# Patient Record
Sex: FEMALE | Race: WHITE
[De-identification: names, ages, dates, MRNs, and addresses within clinical notes are randomized per-mention and may not be internally consistent; named-entity substitution may affect disease eponyms.]

---

## 2020-10-14 ENCOUNTER — HOSPITAL ENCOUNTER (OUTPATIENT)
Dept: HOSPITAL 47 - ORWHC2ENDO | Age: 70
Discharge: HOME | End: 2020-10-14
Attending: INTERNAL MEDICINE
Payer: MEDICARE

## 2020-10-14 VITALS — DIASTOLIC BLOOD PRESSURE: 74 MMHG | SYSTOLIC BLOOD PRESSURE: 117 MMHG | RESPIRATION RATE: 18 BRPM | HEART RATE: 69 BPM

## 2020-10-14 VITALS — TEMPERATURE: 97.5 F

## 2020-10-14 VITALS — BODY MASS INDEX: 33.8 KG/M2

## 2020-10-14 DIAGNOSIS — F32.9: ICD-10-CM

## 2020-10-14 DIAGNOSIS — Z79.51: ICD-10-CM

## 2020-10-14 DIAGNOSIS — J45.909: ICD-10-CM

## 2020-10-14 DIAGNOSIS — Z90.710: ICD-10-CM

## 2020-10-14 DIAGNOSIS — G43.909: ICD-10-CM

## 2020-10-14 DIAGNOSIS — Z96.651: ICD-10-CM

## 2020-10-14 DIAGNOSIS — Z86.010: ICD-10-CM

## 2020-10-14 DIAGNOSIS — Z79.890: ICD-10-CM

## 2020-10-14 DIAGNOSIS — K62.1: ICD-10-CM

## 2020-10-14 DIAGNOSIS — Z88.1: ICD-10-CM

## 2020-10-14 DIAGNOSIS — E07.9: ICD-10-CM

## 2020-10-14 DIAGNOSIS — E78.5: ICD-10-CM

## 2020-10-14 DIAGNOSIS — Z88.5: ICD-10-CM

## 2020-10-14 DIAGNOSIS — Z86.69: ICD-10-CM

## 2020-10-14 DIAGNOSIS — Z12.11: Primary | ICD-10-CM

## 2020-10-14 DIAGNOSIS — Z79.899: ICD-10-CM

## 2020-10-14 DIAGNOSIS — Z98.890: ICD-10-CM

## 2020-10-14 PROCEDURE — 45380 COLONOSCOPY AND BIOPSY: CPT

## 2020-10-14 PROCEDURE — 88305 TISSUE EXAM BY PATHOLOGIST: CPT

## 2020-10-14 RX ADMIN — POTASSIUM CHLORIDE SCH MLS: 14.9 INJECTION, SOLUTION INTRAVENOUS at 08:44

## 2020-10-14 RX ADMIN — POTASSIUM CHLORIDE SCH MLS: 14.9 INJECTION, SOLUTION INTRAVENOUS at 08:26

## 2020-10-14 NOTE — P.PCN
Date of Procedure: 10/14/20


Procedure(s) Performed: 


BRIEF HISTORY: Patient is a 62-year-old pleasant white female scheduled for an 

elective colonoscopy as a part of screening for colorectal neoplasia.





PROCEDURE PERFORMED: Colonoscopy with biopsy. 





PREOPERATIVE DIAGNOSIS: Screening for colon cancer. 





IV sedation per Anesthesia. 





PROCEDURE: After informed consent was obtained, the patient, was brought into 

the endoscopy unit. IV sedation was administered by Anesthesia under continuous 

monitoring.  Digital rectal examination was normal. Initially the Olympus CF-160

flexible video colonoscope was then inserted in the rectum, gradually advanced 

into the cecum without any difficulty. Careful examination was performed as the 

scope was gradually being withdrawn. Ileocecal valve and the appendiceal orifice

were visualized and appeared normal.  Prep was excellent. Mucosa of the cecum, 

ascending colon, transverse colon, descending colon, sigmoid colon, and rectum 

appeared normal.  In the distal rectum there were 2 small 3 mm polyps removed by

cold biopsy. Retroflexion was performed in the rectum and no lesions were seen. 

The patient tolerated the procedure well. 





IMPRESSION: 


3 mm 2 rectal polyps status post removal by cold biopsy


Rest of the colon appeared





RECOMMENDATIONS:  Findings of this examination were discussed with the patient 

as well as her family.  She was advised to follow with the biopsy results.  If 

the biopsy shows an adenoma she can have a repeat colonoscopy in 5 years.


70

## 2021-06-21 ENCOUNTER — HOSPITAL ENCOUNTER (OUTPATIENT)
Dept: HOSPITAL 47 - RADMAMWWP | Age: 71
Discharge: HOME | End: 2021-06-21
Attending: FAMILY MEDICINE
Payer: MEDICARE

## 2021-06-21 DIAGNOSIS — Z78.0: ICD-10-CM

## 2021-06-21 DIAGNOSIS — R06.2: ICD-10-CM

## 2021-06-21 DIAGNOSIS — M47.816: ICD-10-CM

## 2021-06-21 DIAGNOSIS — Z12.31: Primary | ICD-10-CM

## 2021-06-21 DIAGNOSIS — Z80.3: ICD-10-CM

## 2021-06-21 DIAGNOSIS — M51.36: ICD-10-CM

## 2021-06-21 PROCEDURE — 71046 X-RAY EXAM CHEST 2 VIEWS: CPT

## 2021-06-21 PROCEDURE — 77063 BREAST TOMOSYNTHESIS BI: CPT

## 2021-06-21 PROCEDURE — 77067 SCR MAMMO BI INCL CAD: CPT

## 2021-06-21 PROCEDURE — 72110 X-RAY EXAM L-2 SPINE 4/>VWS: CPT

## 2021-06-21 NOTE — XR
EXAMINATION TYPE: XR lumbosacral spine min 4V

 

DATE OF EXAM: 6/21/2021

 

CLINICAL HISTORY: Back pain

 

TECHNIQUE: Frontal, lateral, and oblique images of the lumbar spine are obtained.

 

COMPARISON: None

 

FINDINGS:  There are 5 lumbar type vertebral bodies identified.  The lumbar spine shows satisfactory 
alignment without evidence of acute fracture or dislocation. There is multilevel endplate sclerosis a
nd osteophytosis with facet arthropathy. Vascular calcifications are seen.

 

IMPRESSION:  

Multilevel disc disease and osteoarthritic changes of the lumbar spine.

## 2021-06-21 NOTE — XR
EXAMINATION TYPE: XR chest 2V

 

DATE OF EXAM: 6/21/2021

 

COMPARISON: NONE

 

HISTORY: Wheezing

 

TECHNIQUE:  Frontal and lateral views of the chest are obtained.

 

FINDINGS:  There is no focal air space opacity, pleural effusion, or pneumothorax seen.  The cardiac 
silhouette size is within normal limits.   The osseous structures are intact.

 

IMPRESSION:  No acute cardiopulmonary process.

## 2021-06-23 NOTE — MM
Reason for exam: screening  (asymptomatic).

Last mammogram was performed 12 years and 10 months ago.



History:

Patient is postmenopausal.

Family history of breast cancer in cousin and breast cancer in cousin at age 40.

Benign stereotactic core biopsy of the right breast, January 5, 2005.

Took hormonal contraceptives for 4 years.  Took estrogen for 8 years 6 months 

beginning at age 49.



Physical Findings:

A clinical breast exam by your physician is recommended on an annual basis and 

results should be correlated with mammographic findings.



MG 3D Screening Mammo W/Cad

Bilateral CC and MLO view(s) were taken.

Prior study comparison: January 20, 2020, mammogram, performed at [place].  

January 18, 2019, mammogram, performed at [place].  August 11, 2008, left breast 

mammogram dig work up.  August 5, 2008, bilateral digital screening mammogram.

The breast tissue is heterogeneously dense. This may lower the sensitivity of 

mammography.  Previous mammotome biopsy in the right and left breast.  No 

significant changes when compared with prior studies.





ASSESSMENT: Benign, BI-RAD 2



RECOMMENDATION:

Routine screening mammogram of both breasts in 1 year.

## 2022-06-22 ENCOUNTER — HOSPITAL ENCOUNTER (OUTPATIENT)
Dept: HOSPITAL 47 - RADMAMWWP | Age: 72
Discharge: HOME | End: 2022-06-22
Attending: FAMILY MEDICINE
Payer: MEDICARE

## 2022-06-22 DIAGNOSIS — Z78.0: ICD-10-CM

## 2022-06-22 DIAGNOSIS — Z80.3: ICD-10-CM

## 2022-06-22 DIAGNOSIS — Z12.31: Primary | ICD-10-CM

## 2022-06-22 PROCEDURE — 77063 BREAST TOMOSYNTHESIS BI: CPT

## 2022-06-22 PROCEDURE — 77067 SCR MAMMO BI INCL CAD: CPT

## 2022-06-23 NOTE — MM
Reason for Exam: Screening  (asymptomatic). 

Last screening mammogram was performed 12 month(s) ago.





Patient History: 

Menarche at age 17. First Full-Term Pregnancy at age 23. Hysterectomy at age 37. Postmenopausal.

Estrogen, starting at age 49 for 8 years, 6 months. Patient used Hormonal Contraceptives for 4

years. 1/5/2005, Benign Stereotactic Core Biopsy on the right side.

Maternal cousin had breast cancer. Maternal cousin had breast cancer, age 40. 





Risk Values: 

Idania 5 year model risk: 1.7%.

NCI Lifetime model risk: 5.1%.





Prior Study Comparison: 

1/18/2019  Screening Mammogram, [place]. 1/20/2020  Screening Mammogram, [place]. 6/21/2021

Bilateral Screening Mammogram, [facility]. 





Tissue Density: 

The breast tissue is heterogeneously dense. This may lower the sensitivity of mammography.





Findings: 

Analyzed By CAD. 

There is no suspicious group of microcalcifications or new suspicious mass in either breast. 





Overall Assessment: Benign, BI-RAD 2





Management: 

Screening Mammogram of both breasts in 1 year.

A clinical breast exam by your physician is recommended on an annual basis and results should be

correlated with mammographic findings.



Electronically signed and approved by: Leopold M. Fregoli, M.D. Radiologis

## 2022-08-01 ENCOUNTER — HOSPITAL ENCOUNTER (OUTPATIENT)
Dept: HOSPITAL 47 - RADUSWWP | Age: 72
Discharge: HOME | End: 2022-08-01
Attending: SURGERY
Payer: MEDICARE

## 2022-08-01 DIAGNOSIS — I87.2: ICD-10-CM

## 2022-08-01 DIAGNOSIS — M79.662: Primary | ICD-10-CM

## 2022-08-01 PROCEDURE — 93970 EXTREMITY STUDY: CPT

## 2022-08-01 NOTE — US
EXAMINATION TYPE: US venous doppler duplex LE 

 

DATE OF EXAM: 8/1/2022 12:35 PM

 

COMPARISON: NONE

 

CLINICAL HISTORY: M79.662 PAIN, I87.2 VENOUS INSUFFICIENCY. Patient had a surgery done left leg for v
enous insufficiency.  Pain.  No hx DVT.  Not on blood thinners.  No swelling. 

 

SIDE PERFORMED: Bilateral  

 

TECHNIQUE:  The lower extremity deep venous system is examined utilizing real time linear array sonog
atif with graded compression, doppler sonography and color-flow sonography.

 

VESSELS IMAGED:

Common Femoral Vein

Deep Femoral Vein

Greater Saphenous Vein *

Femoral Vein

Popliteal Vein

Small Saphenous Vein *

Proximal Calf Veins

(* superficial vessels)

 

 

 

Right Leg:  Negative for DVT

 

Left Leg:  Negative for DVT

 

Grayscale, color doppler, spectral doppler imaging performed of the deep veins of the bilateral lower
 extremities.  There is normal flow, compressibility, vascular waveforms.

 

 

IMPRESSION:  No ultrasound evidence for acute DVT in either lower extremity.

## 2023-08-16 ENCOUNTER — HOSPITAL ENCOUNTER (OUTPATIENT)
Dept: HOSPITAL 47 - RADMAMWWP | Age: 73
Discharge: HOME | End: 2023-08-16
Attending: FAMILY MEDICINE
Payer: MEDICARE

## 2023-08-16 DIAGNOSIS — Z12.31: Primary | ICD-10-CM

## 2023-08-16 DIAGNOSIS — Z80.3: ICD-10-CM

## 2023-08-16 DIAGNOSIS — M85.89: ICD-10-CM

## 2023-08-16 DIAGNOSIS — Z78.0: ICD-10-CM

## 2023-08-16 PROCEDURE — 77080 DXA BONE DENSITY AXIAL: CPT

## 2023-08-16 PROCEDURE — 77067 SCR MAMMO BI INCL CAD: CPT

## 2023-08-16 PROCEDURE — 77063 BREAST TOMOSYNTHESIS BI: CPT

## 2023-08-17 NOTE — BD
EXAMINATION TYPE: Axial Bone Density

 

DATE OF EXAM: 2023

 

CLINICAL HISTORY: 73 years old Female.  ICD-10 CODE: N95.1 MENOPAUSAL AND FEMALE CL

 

Height:  62.5 in

Weight:  192 lbs

 

FRAX RISK QUESTIONS:

Secondary Osteoporosis:

  3.  Menopause before 45: partial hysterectomy age 32

  

 

RISK FACTORS 

HISTORY OF: 

Family History of Osteoporosis: yes maternal grandmother

Active: yes

Diet low in dairy products/other sources of calcium:  yes

Postmenopausal woman: partial hysterectomy age 32

Take estrogen and/or progesterone medications: not now

How long: 3 years

 

MEDICATIONS: 

Thyroid Medications:  yes

Which medication: Levothyroxine

How Lon+ years

Additional Medications: blood pressure meds, depression meds, migraine med injections, cholesterol me
ds 

 

 

 

EXAM MEASUREMENTS: 

Bone mineral densitometry was performed using the [employer] System.

Bone mineral density as measured about the Lumbar spine is:  

----- L1-L4(G/cm2): 1.105

T Score Values are as follows:

----- L1: -1.5

----- L2: -1.2

----- L3: -0.5

----- L4: 0.3

----- L1-L4: -0.6

 

Z Score Values are as follows:

----- L1: -0.6

----- L2: -0.3

----- L3: 0.4

----- L4: 1.2

----- L1-L4: 0.3

 

Bone mineral density has: Decreased -5.8% since study of: 12/15/2004

 

Bone mineral density about the R hip (g/cm2): 0.955

Bone mineral density about the L hip (g/cm2): 0.940

T Score values are as follows:

-----R Neck: -1.0

-----L Neck: -1.4

-----R Total: -0.4

-----L Total: -0.5

 

Z Score values are as follows:

-----R Neck: 0.2

-----L Neck: -0.2

-----R Total: 0.6

-----L Total: 0.5

 

Bone mineral density has: Decreased -2.3% since study of: 12/15/2004

 

 

FRAX%s: The graph provided illustrates a 9.3% chance for a major osteoporotic fx and a 1.3% chance fo
r the hips probability for fx in 10 years time.

 

IMPRESSION:

Osteopenia (T Score between -2.5 and -1).

 

There is slightly increased risk of fracture and the patient may be considered 

for treatment. 

 

Re-Screen 2-5 years.

 

NOTE:  T-SCORE=SD OF THE YOUNG ADULT MEAN.

## 2023-08-17 NOTE — MM
Reason for Exam: Screening  (asymptomatic). 

Last mammogram was performed 1 year(s) and 2 month(s) ago. 





Patient History: 

Menarche at age 17. First Full-Term Pregnancy at age 23. Hysterectomy at age 37. Postmenopausal.

Estrogen, starting at age 49 for 8 years, 6 months. Patient used Hormonal Contraceptives for 4

years. 1/5/2005, Benign Stereotactic Core Biopsy on the right side.

Maternal cousin had breast cancer. Maternal cousin had breast cancer, age 40. 





Risk Values: 

Idania 5 year model risk: 1.7%.

NCI Lifetime model risk: 4.7%.





Prior Study Comparison: 

1/20/2020  Screening Mammogram, [place]. 6/21/2021 Bilateral Screening Mammogram, [facility]. 6/22/2022

Bilateral MG 3D screening mammo w/cad, [facility]. 





Tissue Density: 

The breast tissue is heterogeneously dense. This may lower the sensitivity of mammography.





Findings: 

Analyzed By CAD. 

There is no suspicious group of microcalcifications or new suspicious mass in either breast. Biopsy

clips within both breasts. 





Overall Assessment: Benign, BI-RAD 2





Management: 

Screening Mammogram of both breasts in 1 year.

A clinical breast exam by your physician is recommended on an annual basis and results should be

correlated with mammographic findings.



Note on Idania scores and lifetime risk:

1. A Idania score greater than 3% is considered moderate risk. If this is the case, consider

specialist referral to assess eligibility for a risk reducing agent.

If overall lifetime risk for the development of breast cancer is 20% or higher, the patient may

qualify for future screening with alternating mammogram and breast MRI.



Electronically signed and approved by: Ji Serrano D.O.

## 2024-09-25 ENCOUNTER — HOSPITAL ENCOUNTER (OUTPATIENT)
Dept: HOSPITAL 47 - RADMAMWWP | Age: 74
Discharge: HOME | End: 2024-09-25
Attending: FAMILY MEDICINE
Payer: MEDICARE

## 2024-09-25 PROCEDURE — 77067 SCR MAMMO BI INCL CAD: CPT

## 2024-09-25 PROCEDURE — 77063 BREAST TOMOSYNTHESIS BI: CPT

## 2024-09-29 NOTE — MM
Reason for Exam: Screening  (asymptomatic). 

Last mammogram was performed 1 year(s) and 1 month(s) ago. 





Patient History: 

Menarche at age 17. First Full-Term Pregnancy at age 23. Hysterectomy at age 37. Postmenopausal.

Estrogen, starting at age 49 for 8 years, 6 months. Patient used Hormonal Contraceptives for 4

years. 1/5/2005, Benign Stereotactic Core Biopsy on the right side.

Maternal cousin had breast cancer. Maternal cousin had breast cancer, age 40. 





Risk Values: 

Idania 5 year model risk: 1.7%.

NCI Lifetime model risk: 4.4%.





Prior Study Comparison: 

6/21/2021 Bilateral Screening Mammogram, [facility]. 6/22/2022 Bilateral MG 3D screening mammo w/cad, [facility].

8/16/2023 Bilateral MG 3D screening mammo w/cad, [facility]. 





Tissue Density: 

The breasts are heterogeneously dense, which may obscure small masses.





Findings: 

Analyzed By CAD. 

The pattern is symmetrical.

Benign calcifications are within the bilateral breasts. Or markers are within the bilateral breasts.

No significant interval change is evident.

No suspicious groups of microcalcifications, spiculated or lobular masses, architectural distortion

or other secondary signs of malignancy are mammographically apparent. 





Overall Assessment: Benign, BI-RAD 2





Management: 

Screening Mammogram of both breasts in 1 year.

A negative mammogram report should not preclude additional follow up of suspicious palpable

abnormalities.

Patient should continue monthly self breast exam.

A clinical breast exam by your physician is recommended on an annual basis and results should be

correlated with mammographic findings.



Note on Idania scores and lifetime risk:

1. A Idania score greater than 3% is considered moderate risk. If this is the case, consider

specialist referral to assess eligibility for a risk reducing agent.

2. If overall lifetime risk for the development of breast cancer is 20% or higher, the patient may

qualify for future screening with alternating mammogram and breast MRI.



[facility], Workstation: [id number], 9/29/2024 9:39 PM.



Electronically signed and approved by: Shan Bettencourt D.O. Radiologis